# Patient Record
Sex: FEMALE | Race: BLACK OR AFRICAN AMERICAN | NOT HISPANIC OR LATINO | URBAN - METROPOLITAN AREA
[De-identification: names, ages, dates, MRNs, and addresses within clinical notes are randomized per-mention and may not be internally consistent; named-entity substitution may affect disease eponyms.]

---

## 2017-12-01 ENCOUNTER — OUTPATIENT (OUTPATIENT)
Dept: OUTPATIENT SERVICES | Facility: HOSPITAL | Age: 32
LOS: 1 days | End: 2017-12-01
Payer: MEDICAID

## 2017-12-12 DIAGNOSIS — R69 ILLNESS, UNSPECIFIED: ICD-10-CM

## 2018-03-01 PROCEDURE — G9001: CPT

## 2022-12-21 ENCOUNTER — INPATIENT (INPATIENT)
Facility: HOSPITAL | Age: 37
LOS: 1 days | Discharge: HOME | DRG: 517 | End: 2022-12-23
Attending: FAMILY MEDICINE | Admitting: HOSPITALIST
Payer: MEDICAID

## 2022-12-21 ENCOUNTER — TRANSCRIPTION ENCOUNTER (OUTPATIENT)
Age: 37
End: 2022-12-21

## 2022-12-21 VITALS
HEIGHT: 64 IN | OXYGEN SATURATION: 99 % | DIASTOLIC BLOOD PRESSURE: 77 MMHG | TEMPERATURE: 98 F | SYSTOLIC BLOOD PRESSURE: 118 MMHG | HEART RATE: 64 BPM | RESPIRATION RATE: 16 BRPM | WEIGHT: 212.75 LBS

## 2022-12-21 DIAGNOSIS — M51.9 UNSPECIFIED THORACIC, THORACOLUMBAR AND LUMBOSACRAL INTERVERTEBRAL DISC DISORDER: ICD-10-CM

## 2022-12-21 LAB
ALBUMIN SERPL ELPH-MCNC: 4.3 G/DL — SIGNIFICANT CHANGE UP (ref 3.3–5.2)
ALLERGY+IMMUNOLOGY DIAG STUDY NOTE: SIGNIFICANT CHANGE UP
ALP SERPL-CCNC: 94 U/L — SIGNIFICANT CHANGE UP (ref 40–120)
ALT FLD-CCNC: 11 U/L — SIGNIFICANT CHANGE UP
ANION GAP SERPL CALC-SCNC: 11 MMOL/L — SIGNIFICANT CHANGE UP (ref 5–17)
APTT BLD: 28.7 SEC — SIGNIFICANT CHANGE UP (ref 27.5–35.5)
AST SERPL-CCNC: 14 U/L — SIGNIFICANT CHANGE UP
BILIRUB SERPL-MCNC: 0.2 MG/DL — LOW (ref 0.4–2)
BLD GP AB SCN SERPL QL: SIGNIFICANT CHANGE UP
BUN SERPL-MCNC: 14 MG/DL — SIGNIFICANT CHANGE UP (ref 8–20)
CALCIUM SERPL-MCNC: 9.1 MG/DL — SIGNIFICANT CHANGE UP (ref 8.4–10.5)
CHLORIDE SERPL-SCNC: 102 MMOL/L — SIGNIFICANT CHANGE UP (ref 96–108)
CO2 SERPL-SCNC: 21 MMOL/L — LOW (ref 22–29)
CREAT SERPL-MCNC: 0.68 MG/DL — SIGNIFICANT CHANGE UP (ref 0.5–1.3)
DIR ANTIGLOB POLYSPECIFIC INTERPRETATION: SIGNIFICANT CHANGE UP
EGFR: 116 ML/MIN/1.73M2 — SIGNIFICANT CHANGE UP
FLUAV AG NPH QL: SIGNIFICANT CHANGE UP
FLUBV AG NPH QL: SIGNIFICANT CHANGE UP
GLUCOSE SERPL-MCNC: 129 MG/DL — HIGH (ref 70–99)
HCG SERPL-ACNC: <4 MIU/ML — SIGNIFICANT CHANGE UP
HCT VFR BLD CALC: 37 % — SIGNIFICANT CHANGE UP (ref 34.5–45)
HGB BLD-MCNC: 12.2 G/DL — SIGNIFICANT CHANGE UP (ref 11.5–15.5)
INR BLD: 1.17 RATIO — HIGH (ref 0.88–1.16)
MCHC RBC-ENTMCNC: 30.3 PG — SIGNIFICANT CHANGE UP (ref 27–34)
MCHC RBC-ENTMCNC: 33 GM/DL — SIGNIFICANT CHANGE UP (ref 32–36)
MCV RBC AUTO: 92 FL — SIGNIFICANT CHANGE UP (ref 80–100)
PLATELET # BLD AUTO: 341 K/UL — SIGNIFICANT CHANGE UP (ref 150–400)
POTASSIUM SERPL-MCNC: 4 MMOL/L — SIGNIFICANT CHANGE UP (ref 3.5–5.3)
POTASSIUM SERPL-SCNC: 4 MMOL/L — SIGNIFICANT CHANGE UP (ref 3.5–5.3)
PROT SERPL-MCNC: 7.8 G/DL — SIGNIFICANT CHANGE UP (ref 6.6–8.7)
PROTHROM AB SERPL-ACNC: 13.6 SEC — HIGH (ref 10.5–13.4)
RBC # BLD: 4.02 M/UL — SIGNIFICANT CHANGE UP (ref 3.8–5.2)
RBC # FLD: 12.5 % — SIGNIFICANT CHANGE UP (ref 10.3–14.5)
RSV RNA NPH QL NAA+NON-PROBE: SIGNIFICANT CHANGE UP
SARS-COV-2 RNA SPEC QL NAA+PROBE: SIGNIFICANT CHANGE UP
SODIUM SERPL-SCNC: 134 MMOL/L — LOW (ref 135–145)
WBC # BLD: 12.38 K/UL — HIGH (ref 3.8–10.5)
WBC # FLD AUTO: 12.38 K/UL — HIGH (ref 3.8–10.5)

## 2022-12-21 PROCEDURE — 72131 CT LUMBAR SPINE W/O DYE: CPT | Mod: 26,MA

## 2022-12-21 PROCEDURE — 99222 1ST HOSP IP/OBS MODERATE 55: CPT

## 2022-12-21 PROCEDURE — 72148 MRI LUMBAR SPINE W/O DYE: CPT | Mod: 26

## 2022-12-21 PROCEDURE — 99285 EMERGENCY DEPT VISIT HI MDM: CPT

## 2022-12-21 RX ORDER — MORPHINE SULFATE 50 MG/1
4 CAPSULE, EXTENDED RELEASE ORAL ONCE
Refills: 0 | Status: DISCONTINUED | OUTPATIENT
Start: 2022-12-21 | End: 2022-12-21

## 2022-12-21 RX ORDER — DEXAMETHASONE 0.5 MG/5ML
10 ELIXIR ORAL ONCE
Refills: 0 | Status: COMPLETED | OUTPATIENT
Start: 2022-12-21 | End: 2022-12-21

## 2022-12-21 RX ORDER — SODIUM CHLORIDE 9 MG/ML
1000 INJECTION, SOLUTION INTRAVENOUS
Refills: 0 | Status: DISCONTINUED | OUTPATIENT
Start: 2022-12-21 | End: 2022-12-22

## 2022-12-21 RX ORDER — METHOCARBAMOL 500 MG/1
1500 TABLET, FILM COATED ORAL ONCE
Refills: 0 | Status: COMPLETED | OUTPATIENT
Start: 2022-12-21 | End: 2022-12-21

## 2022-12-21 RX ORDER — ONDANSETRON 8 MG/1
4 TABLET, FILM COATED ORAL EVERY 8 HOURS
Refills: 0 | Status: DISCONTINUED | OUTPATIENT
Start: 2022-12-21 | End: 2022-12-22

## 2022-12-21 RX ORDER — HYDROMORPHONE HYDROCHLORIDE 2 MG/ML
0.5 INJECTION INTRAMUSCULAR; INTRAVENOUS; SUBCUTANEOUS EVERY 4 HOURS
Refills: 0 | Status: DISCONTINUED | OUTPATIENT
Start: 2022-12-21 | End: 2022-12-22

## 2022-12-21 RX ORDER — LANOLIN ALCOHOL/MO/W.PET/CERES
3 CREAM (GRAM) TOPICAL AT BEDTIME
Refills: 0 | Status: DISCONTINUED | OUTPATIENT
Start: 2022-12-21 | End: 2022-12-22

## 2022-12-21 RX ORDER — ACETAMINOPHEN 500 MG
650 TABLET ORAL EVERY 6 HOURS
Refills: 0 | Status: DISCONTINUED | OUTPATIENT
Start: 2022-12-21 | End: 2022-12-22

## 2022-12-21 RX ORDER — KETOROLAC TROMETHAMINE 30 MG/ML
30 SYRINGE (ML) INJECTION ONCE
Refills: 0 | Status: DISCONTINUED | OUTPATIENT
Start: 2022-12-21 | End: 2022-12-21

## 2022-12-21 RX ADMIN — MORPHINE SULFATE 4 MILLIGRAM(S): 50 CAPSULE, EXTENDED RELEASE ORAL at 15:56

## 2022-12-21 RX ADMIN — Medication 30 MILLIGRAM(S): at 13:12

## 2022-12-21 RX ADMIN — Medication 30 MILLIGRAM(S): at 13:42

## 2022-12-21 RX ADMIN — METHOCARBAMOL 1500 MILLIGRAM(S): 500 TABLET, FILM COATED ORAL at 13:12

## 2022-12-21 RX ADMIN — MORPHINE SULFATE 4 MILLIGRAM(S): 50 CAPSULE, EXTENDED RELEASE ORAL at 21:48

## 2022-12-21 RX ADMIN — MORPHINE SULFATE 4 MILLIGRAM(S): 50 CAPSULE, EXTENDED RELEASE ORAL at 21:03

## 2022-12-21 RX ADMIN — Medication 102 MILLIGRAM(S): at 13:12

## 2022-12-21 RX ADMIN — MORPHINE SULFATE 4 MILLIGRAM(S): 50 CAPSULE, EXTENDED RELEASE ORAL at 21:18

## 2022-12-21 NOTE — ED PROVIDER NOTE - CLINICAL SUMMARY MEDICAL DECISION MAKING FREE TEXT BOX
37-year-old female presents to ED complaining of lower back pain x1 day today.  Patient explained that she was moving and doing normal activity when she felt sharp pain in the left lower back . Patient states the pain is radiate radiating down her left leg into her heel . Patient admits to tingling sensation in her left lower extremity.  Patient denies any lower extremity weakness. Patient denies any urine or fecal incontinence. Examination positive tenderness on palpation of lower lumbar spine, tenderness to spinal palpation, tenderness to left paraspinal region.  Straight leg raise positive to the left.  No weakness noted positive but very low pulses intact. Patient treated for pain and CT lumbar spine ordered

## 2022-12-21 NOTE — CONSULT NOTE ADULT - SUBJECTIVE AND OBJECTIVE BOX
HPI:  · HPI Objective Statement: 37-year-old female presents to ED complaining of lower back pain x1 day today.  Patient explained that she was moving and doing normal activity when she felt sharp pain in the left lower back . Patient states the pain is radiate radiating down her left leg into her heel . Patient admits to tingling sensation in her left lower extremity.  Patient denies any lower extremity weakness. Patient denies any urine or fecal incontinence . Patient denies any fall trauma or accidents to her lower back.  Patient denies any significant past medical history.  Patient is a surgical resection of close to her face.  Patient denies any medical medication at this time.  Patient denies any allergies to medication.      Pt denies having any trauma. She states that she was ok till last night at which time she started having severe back pain radiating into the right lower back pain into buttock and right lower extrem. Pt states she was unable to ambulate today that her right lower extrem would give out. Pt denies bowel or bladder incontinence.       PAST MEDICAL & SURGICAL HISTORY:  denies cardiac, respiratory, abd discomfort.     REVIEW OF SYSTEMS:    CONSTITUTIONAL: No fever, weight loss, or fatigue  EYES: No eye pain, visual disturbances, or discharge  ENMT:  No difficulty hearing, tinnitus, vertigo; No sinus or throat pain  NECK: No pain or stiffness  BREASTS: No pain, masses, or nipple discharge  RESPIRATORY: No cough, wheezing, chills or hemoptysis; No shortness of breath  CARDIOVASCULAR: No chest pain, palpitations, dizziness, or leg swelling  GASTROINTESTINAL: No abdominal or epigastric pain. No nausea, vomiting, or hematemesis; No diarrhea or constipation. No melena or hematochezia.  GENITOURINARY: No dysuria, frequency, hematuria, or incontinence  NEUROLOGICAL: No headaches, memory loss, loss of strength, numbness, or tremors  SKIN: No itching, burning, rashes, or lesions   LYMPH NODES: No enlarged glands  ENDOCRINE: No heat or cold intolerance; No hair loss  MUSCULOSKELETAL: No joint pain or swelling; No muscle, back, or extremity pain  PSYCHIATRIC: No depression, anxiety, mood swings, or difficulty sleeping  HEME/LYMPH: No easy bruising, or bleeding gums  ALLERY AND IMMUNOLOGIC: No hives or eczema    Allergies  No Known Allergies  Intolerances      MEDICATIONS  (STANDING):  MEDICATIONS  (PRN):    SOCIAL HISTORY: nonpertinent  FAMILY HISTORY: nonpertinent    Vital Signs Last 24 Hrs  T(C): 36.5 (21 Dec 2022 10:44), Max: 36.5 (21 Dec 2022 10:44)  T(F): 97.7 (21 Dec 2022 10:44), Max: 97.7 (21 Dec 2022 10:44)  HR: 64 (21 Dec 2022 10:44) (64 - 64)  BP: 118/77 (21 Dec 2022 10:44) (118/77 - 118/77)  BP(mean): --  RR: 16 (21 Dec 2022 10:44) (16 - 16)  SpO2: 99% (21 Dec 2022 10:44) (99% - 99%)    Parameters below as of 21 Dec 2022 10:44  Patient On (Oxygen Delivery Method): room air    PHYSICAL EXAM:  GENERAL: NAD,   HEAD:  Atraumatic, Normocephalic  EYES: EOMI, PERRLA, conjunctiva and sclera clear  ENMT: No tonsillar erythema, exudates, or enlargement; Moist mucous membranes, Good dentition, No lesions  NECK: Supple,  NERVOUS SYSTEM:  Alert & Oriented X3, Good concentration; Motor Strength 5/5 B/L upper and right lower buttock pain radiates right lateral thigh into the calf. pos straight leg raise, pos df 4/5, df 4/5 ehl 2/5 lower extremity; left lower 5/5 hip, knee 5/5, df 5/5 pf 5/5, EHL 5/5, neg clonus. DTRs 2+ intact and symmetric  CHEST/LUNG: Clear BS bilaterally;   HEART: Regular rate and rhythm; No murmurs, rubs, or gallops  ABDOMEN: Soft, Nontender, Nondistended; Bowel sounds present  EXTREMITIES:  2+ Peripheral Pulses, No edema  muscular skeleton neg tenderness with palp   sensory pos numbness of the right lower extrem lateral       LABS:      RADIOLOGY & ADDITIONAL STUDIES:  ~~~~~~~~~~~~~~~~~~~~~~~~~~~~~~  < from: CT Lumbar Spine No Cont (12.21.22 @ 14:40) >  ACC: [id number] EXAM:  CT LUMBAR SPINE                        PROCEDURE DATE:  12/21/2022    IMPRESSION:  Moderate right paracentral/foraminal disc extrusion L5-S1 with mass   effect on the right S1 nerve root within the right lateral recess and   severe right neural foramina narrowing.  --- End of Report ---  < end of copied text >

## 2022-12-21 NOTE — ED ADULT NURSE NOTE - OBJECTIVE STATEMENT
right lower back pain that began yesterday and worsened today radiating down right leg.  Pain with ambulation.  Pt denies trauma or illness.

## 2022-12-21 NOTE — ED PROVIDER NOTE - OBJECTIVE STATEMENT
37-year-old female presents to ED complaining of lower back pain x1 day today.  Patient explained that she was moving and doing normal activity when she felt sharp pain in the left lower back . Patient states the pain is radiate radiating down her left leg into her heel . Patient admits to tingling sensation in her left lower extremity.  Patient denies any lower extremity weakness. Patient denies any urine or fecal incontinence . Patient denies any fall trauma or accidents to her lower back.  Patient denies any significant past medical history.  Patient is a surgical resection of close to her face.  Patient denies any medical medication at this time.  Patient denies any allergies to medication. 37-year-old female presents to ED complaining of lower back pain x1 day today.  Patient explained that she was moving and doing normal activity when she felt sharp pain in the left lower back . Patient states the pain is radiate radiating down her right leg into her heel . Patient admits to tingling sensation in her right lower extremity.  Patient denies any lower extremity weakness. Patient denies any urine or fecal incontinence . Patient denies any fall trauma or accidents to her lower back.  Patient denies any significant past medical history.  Patient is a surgical resection of close to her face.  Patient denies any medical medication at this time.  Patient denies any allergies to medication.

## 2022-12-21 NOTE — ED ADULT TRIAGE NOTE - CHIEF COMPLAINT QUOTE
Pt developed right sided back pain radiating down into her right left yesterday. Denies any injury or heavy lifting. No numbness or tingling in her left foot but states that it is tingling in her though. Nothing taken for pain.

## 2022-12-21 NOTE — ED PROVIDER NOTE - NS ED ATTENDING STATEMENT MOD
This was a shared visit with the BERTIN. I reviewed and verified the documentation and independently performed the documented:

## 2022-12-21 NOTE — ED PROVIDER NOTE - PHYSICAL EXAMINATION
.Back :  No deformity, lesions or injury noted to back on inspection. Spine appears normal with normal  anatomical curves. No spinal point tenderness on palpation. + paraspinal muscle point tenderness. Tenderness noted on palpation of gluteal muscle noted on right side . Normal straight leg raise noted. Normal knee and ankle reflex noted.

## 2022-12-21 NOTE — CONSULT NOTE ADULT - ASSESSMENT
This is a 37yf presented with back pain which radiated into the right lower extrem and cause weakness ( the right lower extrem giving out)  pos straight leg raised.   ct of the lumbar spine-mod right hnp l5-s1 with mass effect on the right s1.     Plan  -admit for pain control  -pt will need mri of the lumbar spine today  -preop for lumbar laminectomy  -films and exam reviewed and discussed with Dr Castellanos and recom as above.

## 2022-12-21 NOTE — ED PROVIDER NOTE - PROGRESS NOTE DETAILS
Pt with lower back pain and right lower leg weakness on examination . Normal sensation to left lower extremity and bilateral normal sensation bilateral decreased strength to right lower extremity.  Rest of exam completely normal. Pt moved form intake Room. Pt seen and evaluated by intake Physician. HPI, Physical examination performed by intake Physician . Note reviewed and followup examination performed by me consistent with initial assessment. Agrees with intake Physician plan and tests. CAT scan and L5-S1 mild right paracentral/foraminal disc extrusion with mass-effect on the right S1 nerve root within the right lateral recess and severe right neuroforaminal narrowing noted.  Patient CT is finding discussed with neurosurgery team neurosurgery evaluated patient in the ED and requested MRI and preop

## 2022-12-21 NOTE — CONSULT NOTE ADULT - NS ATTEND AMEND GEN_ALL_CORE FT
The procedure and the goals were explained to the pt. The risks were explained in details. The pt signed the consent

## 2022-12-21 NOTE — ED PROVIDER NOTE - ATTENDING APP SHARED VISIT CONTRIBUTION OF CARE
37-year-old female presents to ED complaining of lower back pain x1 day today.  Patient explained that she was moving and doing normal activity when she felt sharp pain in the left lower back . Patient states the pain is radiate radiating down her right leg into her heel . Patient admits to tingling sensation in her right lower extremity.  Patient denies any lower extremity weakness. Patient denies any urine or fecal incontinence . Patient denies any fall trauma or accidents to her lower back.  Patient denies any significant past medical history.  Patient is a surgical resection of close to her face.  Patient denies any medical medication at this time.  Patient denies any allergies to medication.    on exam pt with pain and wekness with using her left leg. sesntion intact. will obtain ct l spine, pain control, reassess

## 2022-12-22 ENCOUNTER — RESULT REVIEW (OUTPATIENT)
Age: 37
End: 2022-12-22

## 2022-12-22 ENCOUNTER — TRANSCRIPTION ENCOUNTER (OUTPATIENT)
Age: 37
End: 2022-12-22

## 2022-12-22 ENCOUNTER — APPOINTMENT (OUTPATIENT)
Age: 37
End: 2022-12-22

## 2022-12-22 DIAGNOSIS — S01.81XA LACERATION WITHOUT FOREIGN BODY OF OTHER PART OF HEAD, INITIAL ENCOUNTER: Chronic | ICD-10-CM

## 2022-12-22 LAB
ABO RH CONFIRMATION: SIGNIFICANT CHANGE UP
ANION GAP SERPL CALC-SCNC: 12 MMOL/L — SIGNIFICANT CHANGE UP (ref 5–17)
APPEARANCE UR: CLEAR — SIGNIFICANT CHANGE UP
BACTERIA # UR AUTO: ABNORMAL
BILIRUB UR-MCNC: NEGATIVE — SIGNIFICANT CHANGE UP
BUN SERPL-MCNC: 15 MG/DL — SIGNIFICANT CHANGE UP (ref 8–20)
CALCIUM SERPL-MCNC: 9 MG/DL — SIGNIFICANT CHANGE UP (ref 8.4–10.5)
CHLORIDE SERPL-SCNC: 102 MMOL/L — SIGNIFICANT CHANGE UP (ref 96–108)
CO2 SERPL-SCNC: 22 MMOL/L — SIGNIFICANT CHANGE UP (ref 22–29)
COLOR SPEC: YELLOW — SIGNIFICANT CHANGE UP
CREAT SERPL-MCNC: 0.67 MG/DL — SIGNIFICANT CHANGE UP (ref 0.5–1.3)
DIFF PNL FLD: ABNORMAL
EGFR: 117 ML/MIN/1.73M2 — SIGNIFICANT CHANGE UP
EPI CELLS # UR: SIGNIFICANT CHANGE UP
GLUCOSE SERPL-MCNC: 141 MG/DL — HIGH (ref 70–99)
GLUCOSE UR QL: NEGATIVE MG/DL — SIGNIFICANT CHANGE UP
HCT VFR BLD CALC: 34.3 % — LOW (ref 34.5–45)
HGB BLD-MCNC: 11.1 G/DL — LOW (ref 11.5–15.5)
KETONES UR-MCNC: NEGATIVE — SIGNIFICANT CHANGE UP
LEUKOCYTE ESTERASE UR-ACNC: NEGATIVE — SIGNIFICANT CHANGE UP
MAGNESIUM SERPL-MCNC: 1.9 MG/DL — SIGNIFICANT CHANGE UP (ref 1.6–2.6)
MCHC RBC-ENTMCNC: 30 PG — SIGNIFICANT CHANGE UP (ref 27–34)
MCHC RBC-ENTMCNC: 32.4 GM/DL — SIGNIFICANT CHANGE UP (ref 32–36)
MCV RBC AUTO: 92.7 FL — SIGNIFICANT CHANGE UP (ref 80–100)
MRSA PCR RESULT.: SIGNIFICANT CHANGE UP
NITRITE UR-MCNC: NEGATIVE — SIGNIFICANT CHANGE UP
PH UR: 6.5 — SIGNIFICANT CHANGE UP (ref 5–8)
PHOSPHATE SERPL-MCNC: 2.4 MG/DL — SIGNIFICANT CHANGE UP (ref 2.4–4.7)
PLATELET # BLD AUTO: 323 K/UL — SIGNIFICANT CHANGE UP (ref 150–400)
POTASSIUM SERPL-MCNC: 3.8 MMOL/L — SIGNIFICANT CHANGE UP (ref 3.5–5.3)
POTASSIUM SERPL-SCNC: 3.8 MMOL/L — SIGNIFICANT CHANGE UP (ref 3.5–5.3)
PROT UR-MCNC: NEGATIVE — SIGNIFICANT CHANGE UP
RBC # BLD: 3.7 M/UL — LOW (ref 3.8–5.2)
RBC # FLD: 12.6 % — SIGNIFICANT CHANGE UP (ref 10.3–14.5)
RBC CASTS # UR COMP ASSIST: ABNORMAL /HPF (ref 0–4)
S AUREUS DNA NOSE QL NAA+PROBE: SIGNIFICANT CHANGE UP
SODIUM SERPL-SCNC: 136 MMOL/L — SIGNIFICANT CHANGE UP (ref 135–145)
SP GR SPEC: 1.01 — SIGNIFICANT CHANGE UP (ref 1.01–1.02)
UROBILINOGEN FLD QL: NEGATIVE MG/DL — SIGNIFICANT CHANGE UP
WBC # BLD: 11.82 K/UL — HIGH (ref 3.8–10.5)
WBC # FLD AUTO: 11.82 K/UL — HIGH (ref 3.8–10.5)
WBC UR QL: SIGNIFICANT CHANGE UP /HPF (ref 0–5)

## 2022-12-22 PROCEDURE — 93010 ELECTROCARDIOGRAM REPORT: CPT

## 2022-12-22 PROCEDURE — 88304 TISSUE EXAM BY PATHOLOGIST: CPT | Mod: 26

## 2022-12-22 PROCEDURE — 63030 LAMOT DCMPRN NRV RT 1 LMBR: CPT | Mod: RT

## 2022-12-22 PROCEDURE — 71045 X-RAY EXAM CHEST 1 VIEW: CPT | Mod: 26

## 2022-12-22 PROCEDURE — 99233 SBSQ HOSP IP/OBS HIGH 50: CPT

## 2022-12-22 DEVICE — SURGIFOAM PAD 8CM X 12.5CM X 10MM (100): Type: IMPLANTABLE DEVICE | Status: FUNCTIONAL

## 2022-12-22 DEVICE — FLOSEAL FAST PREP 10ML: Type: IMPLANTABLE DEVICE | Status: FUNCTIONAL

## 2022-12-22 RX ORDER — OXYCODONE HYDROCHLORIDE 5 MG/1
5 TABLET ORAL EVERY 6 HOURS
Refills: 0 | Status: DISCONTINUED | OUTPATIENT
Start: 2022-12-22 | End: 2022-12-23

## 2022-12-22 RX ORDER — ALBUTEROL 90 UG/1
2.5 AEROSOL, METERED ORAL ONCE
Refills: 0 | Status: COMPLETED | OUTPATIENT
Start: 2022-12-22 | End: 2022-12-22

## 2022-12-22 RX ORDER — CYCLOBENZAPRINE HYDROCHLORIDE 10 MG/1
10 TABLET, FILM COATED ORAL EVERY 8 HOURS
Refills: 0 | Status: DISCONTINUED | OUTPATIENT
Start: 2022-12-22 | End: 2022-12-23

## 2022-12-22 RX ORDER — INFLUENZA VIRUS VACCINE 15; 15; 15; 15 UG/.5ML; UG/.5ML; UG/.5ML; UG/.5ML
0.5 SUSPENSION INTRAMUSCULAR ONCE
Refills: 0 | Status: DISCONTINUED | OUTPATIENT
Start: 2022-12-22 | End: 2022-12-23

## 2022-12-22 RX ORDER — CEFAZOLIN SODIUM 1 G
2000 VIAL (EA) INJECTION EVERY 8 HOURS
Refills: 0 | Status: DISCONTINUED | OUTPATIENT
Start: 2022-12-22 | End: 2022-12-22

## 2022-12-22 RX ORDER — OXYCODONE HYDROCHLORIDE 5 MG/1
10 TABLET ORAL EVERY 6 HOURS
Refills: 0 | Status: DISCONTINUED | OUTPATIENT
Start: 2022-12-22 | End: 2022-12-23

## 2022-12-22 RX ORDER — HYDROMORPHONE HYDROCHLORIDE 2 MG/ML
0.5 INJECTION INTRAMUSCULAR; INTRAVENOUS; SUBCUTANEOUS
Refills: 0 | Status: DISCONTINUED | OUTPATIENT
Start: 2022-12-22 | End: 2022-12-22

## 2022-12-22 RX ORDER — ONDANSETRON 8 MG/1
4 TABLET, FILM COATED ORAL ONCE
Refills: 0 | Status: DISCONTINUED | OUTPATIENT
Start: 2022-12-22 | End: 2022-12-22

## 2022-12-22 RX ORDER — FENTANYL CITRATE 50 UG/ML
25 INJECTION INTRAVENOUS
Refills: 0 | Status: DISCONTINUED | OUTPATIENT
Start: 2022-12-22 | End: 2022-12-22

## 2022-12-22 RX ORDER — POLYETHYLENE GLYCOL 3350 17 G/17G
17 POWDER, FOR SOLUTION ORAL EVERY 24 HOURS
Refills: 0 | Status: DISCONTINUED | OUTPATIENT
Start: 2022-12-22 | End: 2022-12-23

## 2022-12-22 RX ORDER — APREPITANT 80 MG/1
40 CAPSULE ORAL ONCE
Refills: 0 | Status: COMPLETED | OUTPATIENT
Start: 2022-12-22 | End: 2022-12-22

## 2022-12-22 RX ORDER — ALBUTEROL 90 UG/1
2.5 AEROSOL, METERED ORAL ONCE
Refills: 0 | Status: DISCONTINUED | OUTPATIENT
Start: 2022-12-22 | End: 2022-12-22

## 2022-12-22 RX ORDER — SENNA PLUS 8.6 MG/1
2 TABLET ORAL AT BEDTIME
Refills: 0 | Status: DISCONTINUED | OUTPATIENT
Start: 2022-12-22 | End: 2022-12-23

## 2022-12-22 RX ORDER — CEFAZOLIN SODIUM 1 G
2000 VIAL (EA) INJECTION EVERY 8 HOURS
Refills: 0 | Status: COMPLETED | OUTPATIENT
Start: 2022-12-22 | End: 2022-12-23

## 2022-12-22 RX ADMIN — CYCLOBENZAPRINE HYDROCHLORIDE 10 MILLIGRAM(S): 10 TABLET, FILM COATED ORAL at 22:32

## 2022-12-22 RX ADMIN — OXYCODONE HYDROCHLORIDE 10 MILLIGRAM(S): 5 TABLET ORAL at 21:00

## 2022-12-22 RX ADMIN — Medication 650 MILLIGRAM(S): at 01:15

## 2022-12-22 RX ADMIN — SODIUM CHLORIDE 125 MILLILITER(S): 9 INJECTION, SOLUTION INTRAVENOUS at 00:45

## 2022-12-22 RX ADMIN — POLYETHYLENE GLYCOL 3350 17 GRAM(S): 17 POWDER, FOR SOLUTION ORAL at 22:30

## 2022-12-22 RX ADMIN — Medication 650 MILLIGRAM(S): at 00:45

## 2022-12-22 RX ADMIN — APREPITANT 40 MILLIGRAM(S): 80 CAPSULE ORAL at 11:38

## 2022-12-22 RX ADMIN — ALBUTEROL 2.5 MILLIGRAM(S): 90 AEROSOL, METERED ORAL at 16:04

## 2022-12-22 RX ADMIN — OXYCODONE HYDROCHLORIDE 10 MILLIGRAM(S): 5 TABLET ORAL at 20:03

## 2022-12-22 RX ADMIN — SENNA PLUS 2 TABLET(S): 8.6 TABLET ORAL at 22:32

## 2022-12-22 RX ADMIN — Medication 2000 MILLIGRAM(S): at 22:35

## 2022-12-22 NOTE — H&P ADULT - ASSESSMENT
38 y/o female with L5/S1 radiculopathy, obesity    Radiculopathy:  -Admit to medical floor  -Neuro Checks  -NPO for OR per Neuro Sx  -IVF, anti-emetics, analgesics  -Incentive spirometer  -Fall risk, VC boots while in bed  -Labs/CXR as above  -Mild leukocytosis likely reactive, no urinary/resp. symptoms reported, RVP neg  -Normal exercise tolerance, no CV risk factors  -No further pre-operative testing needed at this time  -May proceed to OR per Neurosurgical plan     Obesity:  -Diet/exercise education  -Nutrition eval    Discussed with Patient, Mother at bedside, ED staff

## 2022-12-22 NOTE — PROGRESS NOTE ADULT - ASSESSMENT
36 y/o female with  obesity came with back pain with rt leg weakness found to have  mri spine Right foraminal/paracentral disc protrusion at L5-S1 compressing the   traversing.right S1 nerve root and impinging the exiting right L5 nerve root.     L5-S1 Radiculopathy W/ Right foraminal/paracentral disc protrusion at L5-S1 compressing the traversing.  - mri spine Right foraminal/paracentral disc protrusion at L5-S1 compressing the traversing.right S1 nerve root and impinging the exiting right L5 nerve root.   -Neuro Checks  -NPO for OR per Neuro Sx  -IVF, anti-emetics, analgesics  -Incentive spirometer.pain control  -Fall risk, VC boots while in bed  -Labs/CXR as above  -Mild leukocytosis likely reactive, no urinary/resp. symptoms reported, RVP neg  -Normal exercise tolerance, no CV risk factors  -Pt denies any  HX OF HEART/LUNG Disease/bleeding/cloting disorder. denies FH sudden death/heart/lung disease/stroke . neg pregnancy test  -No further pre-operative testing needed at this time  -May proceed to OR per Neurosurgical plan     Obesity:  -Diet/exercise education  -Nutrition eval    HX Marijuana abuse  - to refrain from any illicit drugs  -drugs alcohol socially. ..likely once a month per pt.    scd's  plan of care dw pt        Discussed with Patient, Mother at bedside, ED staff

## 2022-12-22 NOTE — PROGRESS NOTE ADULT - SUBJECTIVE AND OBJECTIVE BOX
POST-OPERATIVE NOTE    Procedure:    Diagnosis/Indication:    Surgeon:    INTERVAL HPI/ACUTE EVENTS:  37yFemale PMH admitted with now s/p ___ POD#0. Patient seen lying comfortably in bed. Denies CP, SOB, SANTOS, calf tenderness. Pain controlled with medication.    VITALS:  T(C): 36.5 (12-22-22 @ 14:48), Max: 37.1 (12-22-22 @ 11:16)  HR: 70 (12-22-22 @ 15:04) (54 - 80)  BP: 139/75 (12-22-22 @ 15:04) (116/64 - 142/68)  RR: 17 (12-22-22 @ 15:04) (14 - 20)  SpO2: 100% (12-22-22 @ 15:04) (98% - 100%)  Wt(kg): --    PHYSICAL EXAM:  GENERAL: NAD, well-groomed, well-developed  HEAD:  S/p L crani. Dressing clean, dry, intact. Dried blood noted, not fully saturated.  DRAINS: Subgaleal/epidural/subdural drain to bulb suction/thumbprint suction/gravity. Serosanguinous drainage noted.  NECK: C-collar in place. Dressing clean, dry, intact  WOUND: Dressing clean dry intact  BC COMA SCORE: E- V- M- =       E: 4= opens eyes spontaneously 3= to voice 2= to noxious 1= no opening       V: 5= oriented 4= confused 3= inappropriate words 2= incomprehensible sounds 1= nonverbal 1T= intubated       M: 6= follows commands 5= localizes 4= withdraws 3= flexor posturing 2= extensor posturing 1= no movement  MENTAL STATUS: AAO x3; Awake/Comatose; Opens eyes spontaneously/to voice/to light touch/to noxious stimuli; Appropriately conversant without aphasia/Nonverbal; following simple commands/mimicking/not following commands  CRANIAL NERVES: Visual acuity normal for age, visual fields full to confrontation, PERRL. EOMI without nystagmus. Facial sensation intact V1-3 distribution b/l. Face symmetric w/ normal eye closure and smile, tongue midline. Hearing grossly intact. Speech clear. Head turning and shoulder shrug intact.   REFLEXES: PERRL. Corneals intact b/l. Gag intact. Cough intact. Oculocephalic reflex intact (Doll's eye). Negative Flaherty's b/l. Negative clonus b/l  MOTOR: strength 5/5 b/l upper and lower extremities  Uppers     Delt (C5/6)     Bicep (C5/6)     Wrist Extend (C6)     Tricep (C7)     HG (C8/T1)  R                     5/5                 5/5                         5/5                           5/5                   5/5  L                      5/5                 5/5                         5/5                           5/5                   5/5  Lowers      HF(L1/L2)     KE (L3)     DF (L4)     EHL (L5)     PF (S1)      R                     5/5              5/5           5/5           5/5            5/5  L                     5/5               5/5          5/5            5/5            5/5  SENSATION: grossly intact to light touch all extremities  COORDINATION: Gait intact; rapid alternating movements intact; heel to shin intact; no upper extremity dysmetria  CHEST/LUNG: Clear to auscultation bilaterally; no rales, rhonchi, wheezing, or rubs  HEART: +S1/+S2; Regular rate and rhythm; no murmurs, rubs, or gallops  ABDOMEN: Soft, nontender, nondistended; bowel sounds present all four quadrants  EXTREMITIES:  2+ peripheral pulses, no clubbing, cyanosis, or edema  SKIN: Warm, dry; no rashes or lesions    LABS:                        11.1   11.82 )-----------( 323      ( 22 Dec 2022 02:20 )             34.3     12-22    136  |  102  |  15.0  ----------------------------<  141<H>  3.8   |  22.0  |  0.67    Ca    9.0      22 Dec 2022 02:20  Phos  2.4     12-22  Mg     1.9     12-22    TPro  7.8  /  Alb  4.3  /  TBili  0.2<L>  /  DBili  x   /  AST  14  /  ALT  11  /  AlkPhos  94  12-21      RADIOLOGY/OTHER:    CAPRINI SCORE [CLOT]:  Patient has an estimated Caprini score of greater than 5.  However, the patient's unique clinical situation will be addressed in an individual manner to determine appropriate anticoagulation treatment, if any. POST-OPERATIVE NOTE  Procedure: L5-S1 discectomy   Diagnosis/Indication: L5-S1 extrusion   Surgeon: Dr. Castellanos  Physician Assistant: Ruben Gamez    INTERVAL HPI/ACUTE EVENTS:  37F w/ PMHX of obesity, marijuana use presents to ED c/o LBP radiating down LLE. Found to have L5-S1  s/p L5-S1 discectomy POD#0  Patient tolerated procedure well. EBL 50 cc, fluid intake 1L, no cerrato. L5-S1 disc herniation, extubated in OR, following commands, transferring to PACU. Seen and examined post op, drowsy. Seen again later in day,     VITALS:  T(C): 36.5 (12-22-22 @ 14:48), Max: 37.1 (12-22-22 @ 11:16)  HR: 70 (12-22-22 @ 15:04) (54 - 80)  BP: 139/75 (12-22-22 @ 15:04) (116/64 - 142/68)  RR: 17 (12-22-22 @ 15:04) (14 - 20)  SpO2: 100% (12-22-22 @ 15:04) (98% - 100%)  Wt(kg): --    PHYSICAL EXAM:  GENERAL: NAD, well-groomed, well-developed  HEAD:  S/p L crani. Dressing clean, dry, intact. Dried blood noted, not fully saturated.  DRAINS: Subgaleal/epidural/subdural drain to bulb suction/thumbprint suction/gravity. Serosanguinous drainage noted.  NECK: C-collar in place. Dressing clean, dry, intact  WOUND: Dressing clean dry intact  BC COMA SCORE: E- V- M- =       E: 4= opens eyes spontaneously 3= to voice 2= to noxious 1= no opening       V: 5= oriented 4= confused 3= inappropriate words 2= incomprehensible sounds 1= nonverbal 1T= intubated       M: 6= follows commands 5= localizes 4= withdraws 3= flexor posturing 2= extensor posturing 1= no movement  MENTAL STATUS: AAO x3; Awake/Comatose; Opens eyes spontaneously/to voice/to light touch/to noxious stimuli; Appropriately conversant without aphasia/Nonverbal; following simple commands/mimicking/not following commands  CRANIAL NERVES: Visual acuity normal for age, visual fields full to confrontation, PERRL. EOMI without nystagmus. Facial sensation intact V1-3 distribution b/l. Face symmetric w/ normal eye closure and smile, tongue midline. Hearing grossly intact. Speech clear. Head turning and shoulder shrug intact.   REFLEXES: PERRL. Corneals intact b/l. Gag intact. Cough intact. Oculocephalic reflex intact (Doll's eye). Negative Flaherty's b/l. Negative clonus b/l  MOTOR: strength 5/5 b/l upper and lower extremities  Uppers     Delt (C5/6)     Bicep (C5/6)     Wrist Extend (C6)     Tricep (C7)     HG (C8/T1)  R                     5/5                 5/5                         5/5                           5/5                   5/5  L                      5/5                 5/5                         5/5                           5/5                   5/5  Lowers      HF(L1/L2)     KE (L3)     DF (L4)     EHL (L5)     PF (S1)      R                     5/5              5/5           5/5           5/5            5/5  L                     5/5               5/5          5/5            5/5            5/5  SENSATION: grossly intact to light touch all extremities  COORDINATION: Gait intact; rapid alternating movements intact; heel to shin intact; no upper extremity dysmetria  CHEST/LUNG: Clear to auscultation bilaterally; no rales, rhonchi, wheezing, or rubs  HEART: +S1/+S2; Regular rate and rhythm; no murmurs, rubs, or gallops  ABDOMEN: Soft, nontender, nondistended; bowel sounds present all four quadrants  EXTREMITIES:  2+ peripheral pulses, no clubbing, cyanosis, or edema  SKIN: Warm, dry; no rashes or lesions    LABS:             11.1   11.82 )-----------( 323      ( 22 Dec 2022 02:20 )             34.3     12-22    136  |  102  |  15.0  ----------------------------<  141<H>  3.8   |  22.0  |  0.67    Ca    9.0      22 Dec 2022 02:20  Phos  2.4     12-22  Mg     1.9     12-22    TPro  7.8  /  Alb  4.3  /  TBili  0.2<L>  /  DBili  x   /  AST  14  /  ALT  11  /  AlkPhos  94  12-21      RADIOLOGY/OTHER:  MR Lumbar Spine No Cont (12.21.22 @ 22:49)   IMPRESSION:  Right foraminal/paracentral disc protrusion at L5-S1 compressing the   traversing right S1 nerve root and impinging the exiting right L5 nerve root.   Recommend surgical consultation.    CT Lumbar Spine No Cont (12.21.22 @ 14:40)   IMPRESSION:  Moderate right paracentral/foraminal disc extrusion L5-S1 with mass   effect on the right S1 nerve root within the right lateral recess and   severe right neural foramina narrowing.       POST-OPERATIVE NOTE  Procedure: L5-S1 discectomy   Diagnosis/Indication: L5-S1 extrusion   Surgeon: Dr. Castellanos  Physician Assistant: Ruben Gamez    INTERVAL HPI/ACUTE EVENTS:  37F w/ PMHX of obesity, marijuana use presents to ED c/o LBP radiating down LLE. Found to have L5-S1  s/p L5-S1 discectomy POD#0  Patient tolerated procedure well. EBL 50 cc, fluid intake 1L, no cerrato. L5-S1 disc herniation, extubated in OR, following commands, transferring to PACU. Seen and examined post op, drowsy. Seen again later in day, much more pleasant, in no distress, sitting upright, tolerating diet. Patient states she has noticed improvement in weakness but did have difficulty ambulating with PT. On exam, neuro intact, strong, symmetrical. Only c/o L hand IV causing discomfort. Has voided but not had a bowel movement post op.     VITALS:  T(C): 36.5 (12-22-22 @ 14:48), Max: 37.1 (12-22-22 @ 11:16)  HR: 70 (12-22-22 @ 15:04) (54 - 80)  BP: 139/75 (12-22-22 @ 15:04) (116/64 - 142/68)  RR: 17 (12-22-22 @ 15:04) (14 - 20)  SpO2: 100% (12-22-22 @ 15:04) (98% - 100%)  Wt(kg): --    PHYSICAL EXAM:  GENERAL: NAD, well-groomed, well-developed  HEAD: Normocephalic, atraumatic   DRAINS: N/a  BACK: Dressing clean dry intact  BC COMA SCORE: E-4 V-5 M-6 = 15  MENTAL STATUS: AAO x3; Awake; Opens eyes spontaneously; Appropriately conversant without aphasia; following simple commands  CRANIAL NERVES: Visual acuity normal for age, PERRL. EOMI without nystagmus. Facial sensation intact V1-3 distribution b/l. Face symmetric w/ normal eye closure and smile, tongue midline. Hearing grossly intact. Speech clear.   MOTOR: strength 5/5 b/l upper and lower extremities; no drift  Uppers     Delt (C5/6)     Bicep (C5/6)     Wrist Extend (C6)     Tricep (C7)     HG (C8/T1)  R                     5/5                 5/5                         5/5                           5/5                   5/5  L                      5/5                 5/5                         5/5                           5/5                   5/5  Lowers      HF(L1/L2)     KE (L3)     DF (L4)     EHL (L5)     PF (S1)      R                     5/5              5/5           5/5           5/5            5/5  L                     5/5               5/5          5/5            5/5            5/5  SENSATION: grossly intact to light touch all extremities  CHEST/LUNG: Nonlabored breaths    LABS:             11.1   11.82 )-----------( 323      ( 22 Dec 2022 02:20 )             34.3     12-22    136  |  102  |  15.0  ----------------------------<  141<H>  3.8   |  22.0  |  0.67    Ca    9.0      22 Dec 2022 02:20  Phos  2.4     12-22  Mg     1.9     12-22    TPro  7.8  /  Alb  4.3  /  TBili  0.2<L>  /  DBili  x   /  AST  14  /  ALT  11  /  AlkPhos  94  12-21      RADIOLOGY/OTHER:  MR Lumbar Spine No Cont (12.21.22 @ 22:49)   IMPRESSION:  Right foraminal/paracentral disc protrusion at L5-S1 compressing the   traversing right S1 nerve root and impinging the exiting right L5 nerve root.   Recommend surgical consultation.    CT Lumbar Spine No Cont (12.21.22 @ 14:40)   IMPRESSION:  Moderate right paracentral/foraminal disc extrusion L5-S1 with mass   effect on the right S1 nerve root within the right lateral recess and   severe right neural foramina narrowing.

## 2022-12-22 NOTE — PROGRESS NOTE ADULT - ASSESSMENT
37F w/ PMHX of obesity, marijuana use presents to ED c/o LBP radiating down LLE. Foudn to have L5-S1           Plan   - PACU followed by floor  - Q4 neuro checks following PACU criteria   - Pain control PRN; Tylenol, Oxy 5/10    - Normotensive   - NS while NPO   - Monitor nausea, vomiting; Zofran PRN   - b/l SCDs; hold AC/AP/chemical DVT prophylaxis at this time   - Ins/outs   - Record stool count; Miralax, Senna   - Advance diet as tolerated   - Increase activity as tolerated, PT/OT ordered   - No brace required   - Wound: Closed w/ Monocryl, steri strips, gauze, tegaderm. May remove Mepilex on POD#3, 12/25. Steri strips to fall off. Permitted to showed on POD#5, 12/27.    - Plan for discharge in AM if medically optimized and cleared by primary team  - D/w Dr. Castellanos  37F w/ PMHX of obesity, marijuana use presents to ED c/o LBP radiating down LLE. Found to have L5-S1  s/p L5-S1 discectomy POD#0      Plan   - PACU followed by floor  - Q4 neuro checks following PACU criteria   - Pain control PRN; Tylenol, Oxy 5/10    - Normotensive   - NS while NPO   - Monitor nausea, vomiting; Zofran PRN   - b/l SCDs; hold AC/AP/chemical DVT prophylaxis at this time   - Ins/outs   - Record stool count; Miralax, Senna   - Advance diet as tolerated   - Increase activity as tolerated, PT/OT ordered   - No brace required   - Wound: Closed w/ Monocryl, steri strips, gauze, tegaderm. May remove Mepilex on POD#3, 12/25. Steri strips to fall off. Permitted to showed on POD#5, 12/27.    - Plan for discharge in AM if medically optimized and cleared by primary team  - D/w Dr. Castellanos

## 2022-12-22 NOTE — PHYSICAL THERAPY INITIAL EVALUATION ADULT - ADDITIONAL COMMENTS
Pt reports living in private home with her sister and her sister's family. 2 MED with no handrail and no stairs inside. Independent prior to admission Owns no DME.

## 2022-12-22 NOTE — H&P ADULT - HISTORY OF PRESENT ILLNESS
38 y/o female with hx of Obesity, presents to [facility] ED with c/o lower back pain for the past 24 hours. She denies any strenuous activity, falls, hard cough/sneezes, falls. Denies any hx of back pain or injury. Her pain is left sided radiating down her leg into heel. No associated weakness, bowel/bladder incontinence or saddle anesthesia. She has not taken any medications for her symptoms. Denies any other recent illnesses, fever, chills, N/V, SOB. Patient seen with Mother at bedside with Patient acknowledgment. In the ED had stable vitals, left leg raise positive. Labs essentially unremarkable. CT of L/S spine with L5/S1 herniation with foraminal narrowing. Seen by NeuroSx and MRI done with results pending. Plan is likely to go to the OR in AM for surgical intervention of disc herniation.

## 2022-12-22 NOTE — PROGRESS NOTE ADULT - SUBJECTIVE AND OBJECTIVE BOX
Patient is a 37y old  Female who presents with a chief complaint of L5/S1 radiculopathy (22 Dec 2022 00:00)      c/o lower back pain radiates to rt leg with some weakness. .denies numbess,bowel/bladder issues, paresthesia   REVIEW OF SYSTEMS: All systems are reviewed and found to be negative except above    MEDICATIONS  (STANDING):  aprepitant 40 milliGRAM(s) Oral once  dextrose 5% + sodium chloride 0.9%. 1000 milliLiter(s) (125 mL/Hr) IV Continuous <Continuous>  influenza   Vaccine 0.5 milliLiter(s) IntraMuscular once    MEDICATIONS  (PRN):  acetaminophen     Tablet .. 650 milliGRAM(s) Oral every 6 hours PRN Temp greater or equal to 38C (100.4F), Mild Pain (1 - 3)  aluminum hydroxide/magnesium hydroxide/simethicone Suspension 30 milliLiter(s) Oral every 4 hours PRN Dyspepsia  HYDROmorphone  Injectable 0.5 milliGRAM(s) IV Push every 4 hours PRN Severe Pain (7 - 10)  melatonin 3 milliGRAM(s) Oral at bedtime PRN Insomnia  ondansetron Injectable 4 milliGRAM(s) IV Push every 8 hours PRN Nausea and/or Vomiting      CAPILLARY BLOOD GLUCOSE        I&O's Summary      PHYSICAL EXAM:  Vital Signs Last 24 Hrs  T(C): 37.1 (22 Dec 2022 11:16), Max: 37.1 (22 Dec 2022 11:16)  T(F): 98.7 (22 Dec 2022 11:16), Max: 98.7 (22 Dec 2022 11:16)  HR: 78 (22 Dec 2022 11:16) (60 - 80)  BP: 136/73 (22 Dec 2022 11:16) (116/64 - 136/73)  BP(mean): 90 (22 Dec 2022 00:00) (90 - 90)  RR: 18 (22 Dec 2022 11:16) (16 - 18)  SpO2: 99% (22 Dec 2022 11:16) (98% - 99%)    Parameters below as of 22 Dec 2022 11:16  Patient On (Oxygen Delivery Method): room air        CONSTITUTIONAL: NAD,  EYES: PERRLA; conjunctiva and sclera clear  ENMT: Moist oral mucosa,   RESPIRATORY: Normal respiratory effort; lungs are clear to auscultation bilaterally  CARDIOVASCULAR: Regular rate and rhythm, normal S1 and S2, no murmur   EXTS: No lower extremity edema; Peripheral pulses are 2+ bilaterally  ABDOMEN: Nontender to palpation, normoactive bowel sounds, no rebound/guarding;   MUSCLOSKELETAL:   BACK: Tender lower L SPINE, rom restricted for pain, decrease leg rising against the gravity with pain.  no joints swelling.   PSYCH: affect appropriate  NEUROLOGY: A+O to person, place, and time; CN 2-12 are intact and symmetric; no gross sensory deficits;       LABS:                        11.1   11.82 )-----------( 323      ( 22 Dec 2022 02:20 )             34.3     12-22    136  |  102  |  15.0  ----------------------------<  141<H>  3.8   |  22.0  |  0.67    Ca    9.0      22 Dec 2022 02:20  Phos  2.4     12-22  Mg     1.9     12-22    TPro  7.8  /  Alb  4.3  /  TBili  0.2<L>  /  DBili  x   /  AST  14  /  ALT  11  /  AlkPhos  94  12-21    PT/INR - ( 21 Dec 2022 20:45 )   PT: 13.6 sec;   INR: 1.17 ratio         PTT - ( 21 Dec 2022 20:45 )  PTT:28.7 sec            RADIOLOGY & ADDITIONAL TESTS:  Results Reviewed:   < from: MR Lumbar Spine No Cont (12.21.22 @ 22:49) >  IMPRESSION:  Right foraminal/paracentral disc protrusion at L5-S1 compressing the   traversing  right S1 nerve root and impinging the exiting right L5 nerve root.   < from: Xray Chest 1 View-PORTABLE IMMEDIATE (Xray Chest 1 View-PORTABLE IMMEDIATE .) (12.22.22 @ 00:33) >  COMPARISON: None available.    Shallow inspiration crowds the chest.    Heart magnified by technique.    No definite infiltrate.    IMPRESSION: As above.    < end of copied text >    < end of copied text >

## 2022-12-22 NOTE — BRIEF OPERATIVE NOTE - ESTIMATED BLOOD LOSS
Medical Necessity Clause: This procedure was medically necessary because the lesions that were treated were: 50 Consent: The patient's consent was obtained including but not limited to risks of crusting, scabbing, blistering, scarring, darker or lighter pigmentary change, recurrence, incomplete removal and infection. Total Number Of Lesions Treated: 3 Post-Care Instructions: I reviewed with the patient in detail post-care instructions. Patient is to wear sunprotection, and avoid picking at any of the treated lesions. Pt may apply Vaseline to crusted or scabbing areas. Call if not resolved in 6 weeks. Medical Necessity Information: It is in your best interest to select a reason for this procedure from the list below. All of these items fulfill various CMS LCD requirements except the new and changing color options. Duration Of Freeze Thaw-Cycle (Seconds): 0 Render Post Care In The Note?: yes Detail Level: Zone Add 52 Modifier (Optional): no

## 2022-12-22 NOTE — PHYSICAL THERAPY INITIAL EVALUATION ADULT - GENERAL OBSERVATIONS, REHAB EVAL
Pt received in bed, + IV Loc, + b/l VCBs, family present, breathing on RA in NAD, in 10/10 low back pain, agreeable to PT evaluation

## 2022-12-22 NOTE — PHYSICAL THERAPY INITIAL EVALUATION ADULT - FOLLOWS COMMANDS/ANSWERS QUESTIONS, REHAB EVAL
"      SUBJECTIVE     The patient is feeling \"much better\" compared to 24 hours ago.  The patient's daughter was on speaker phone during my encounter.  The patient presently has no significant chest pain, shortness of breath at rest, orthopnea, paroxysmal nocturnal dyspnea, lightheadedness or presyncope, or palpitations.  She remains on intravenous unfractionated heparin infusion.  She converted to sinus rhythm at approximately 8:30 AM. She underwent ultrasound-guided pericardiocentesis on 7/29/2022.  Limited transthoracic echocardiogram suggested \"there does not appear to be a significant residual pericardial effusion at completion of study.\"    OBJECTIVE     VITAL SIGNS:  Temp:  [36.3 °C (97.3 °F)-36.9 °C (98.5 °F)] 36.3 °C (97.3 °F)  Heart Rate:  [] 70  Resp:  [16-19] 18  BP: (110-156)/(55-80) 156/65  SPO2 95%    Intake/Output Summary (Last 24 hours) at 7/30/2022 0935  Last data filed at 7/29/2022 1508  Gross per 24 hour   Intake --   Output 10 ml   Net -10 ml     PHYSICAL EXAM:  General Appearance: No apparent distress.  Cooperative.  Neck: No JVD  Lungs: Clear to auscultation bilaterally  Heart: Irregular S1 and S2 without new / changing murmur, gallop or rub  Abdomen: Bowel sounds are present.  Extremities: No significant peripheral edema is appreciated.  Skin: Warm.  Neurologic: Alert and oriented.    LABS / IMAGING / EKG / TELEMETRY     LABS:  Results from last 7 days   Lab Units 07/30/22  0418 07/29/22  0426 07/28/22  0453 07/27/22  1510 07/27/22  0954   SODIUM mEQ/L  --  135* 136  --  139   POTASSIUM mEQ/L  --  3.8 3.9  --  3.4*   MAGNESIUM mg/dL 1.8 1.9 2.1   < >  --    CALCIUM mg/dL  --  9.1 8.8*  --  9.2   CHLORIDE mEQ/L  --  99 103  --  106   CO2 mEQ/L  --  27 23  --  26   BUN mg/dL  --  24* 22*  --  32*   CREATININE mg/dL  --  1.0 1.0  --  1.1   AST IU/L  --   --   --   --  27   ALT IU/L  --   --   --   --  19    < > = values in this interval not displayed.     Results from last 7 days   Lab " Units 07/30/22  0418 07/29/22  1841 07/29/22  0426 07/28/22  0453   WBC K/uL 8.29  --  9.64 9.64   HEMOGLOBIN g/dL 11.4*  --  10.7* 10.9*   HEMATOCRIT % 34.6*  --  32.1* 33.5*   PLATELETS K/uL 349  --  300 278   INR   --  1.4  --   --      Lab Results   Component Value Date    TSH 1.89 07/19/2022     No results found for: CHOL, LDLCALC, HDL, TRIG  Lab Results   Component Value Date     (H) 07/27/2022     Results from last 7 days   Lab Units 07/28/22  0453 07/27/22  2257 07/27/22 2008   HIGH SENSITIVE TROPONIN I pg/mL 221.3* 239.0* 203.0*       TELEMETRY:  Sinus rhythm with occasional supraventricular ectopy    MEDICATIONS         acetaminophen  325 mg oral BID (9a, 10p)    [START ON 8/2/2022] amiodarone  200 mg oral Daily    amiodarone  400 mg oral BID    cetirizine  5 mg oral Daily    clopidogreL  75 mg oral Daily    colchicine  0.6 mg oral Daily (6p)    famotidine  40 mg oral Nightly    metoprolol tartrate  25 mg oral BID    pantoprazole  40 mg oral Daily    simvastatin  20 mg oral Nightly    traMADoL  25 mg oral BID (9a, 10p)       ASSESSMENT AND PLAN     Atrial fibrillation-flutter: The patient converted to sinus rhythm at approximately 8:30 AM on 7/30/2022 and is presently maintaining sinus rhythm.  12 lead EKG ordered.  She is prescribed amiodarone 200 mg daily rhythm maintenance.  She is anticoagulated with intravenous unfractionated heparin for the prevention of thromboembolism.    Anticoagulation: At present she is on intravenous unfractionated heparin with plans to transition to DOAC on 8/1/2022.    Pericardial effusion: Post procedure day #1 status post ultrasound-guided pericardiocentesis.  No evidence of tachycardia, hypertension, or jugular venous distention to suggest reaccumulation of pericardial fluid; plan to reassess limited echocardiogram on 8/1/2022, sooner for change in clinical status.  Concerns of pericarditis, currently receiving colchicine 0.6 mg daily.  At present the  patient denies any significant pleuritic chest pain or shortness of breath.    Coronary artery disease: Proximal to mid left circumflex and proximal right coronary artery PCI performed 6/9/2022; complicated by RCA stent thrombosis status post intervention.  The patient is receiving clopidogrel 75 mg daily in conjunction with systemic anticoagulation.  The patient denies anginal symptoms.  Continue medical management with clopidogrel, simvastatin, and metoprolol.    Tachybradycardia syndrome: Asymptomatic in the context of left bundle dual-chamber chronic pacemaker implant on 7/21/2022.  Continue device surveillance and management deferred to EP.    Hypertension: The blood pressure has been intermittently elevated.  Continue medical management and routine surveillance per unit protocol.  If persistent may require adjustment of antihypertensive medications.    Hyperlipidemia: The patient is managed with simvastatin.    Communicated with Dr. Santos.  Communicated with the patient's daughter.  Communicated with the patient's nurse.    Baljinder Lowery MD  7/30/2022    Primary Care Doctor: Jonathan Meza DO     100% of the time

## 2022-12-23 ENCOUNTER — TRANSCRIPTION ENCOUNTER (OUTPATIENT)
Age: 37
End: 2022-12-23

## 2022-12-23 VITALS
OXYGEN SATURATION: 99 % | HEART RATE: 82 BPM | RESPIRATION RATE: 18 BRPM | DIASTOLIC BLOOD PRESSURE: 67 MMHG | TEMPERATURE: 99 F | SYSTOLIC BLOOD PRESSURE: 114 MMHG

## 2022-12-23 LAB
ANION GAP SERPL CALC-SCNC: 10 MMOL/L — SIGNIFICANT CHANGE UP (ref 5–17)
BUN SERPL-MCNC: 11.2 MG/DL — SIGNIFICANT CHANGE UP (ref 8–20)
CALCIUM SERPL-MCNC: 8.5 MG/DL — SIGNIFICANT CHANGE UP (ref 8.4–10.5)
CHLORIDE SERPL-SCNC: 106 MMOL/L — SIGNIFICANT CHANGE UP (ref 96–108)
CO2 SERPL-SCNC: 23 MMOL/L — SIGNIFICANT CHANGE UP (ref 22–29)
CREAT SERPL-MCNC: 0.68 MG/DL — SIGNIFICANT CHANGE UP (ref 0.5–1.3)
EGFR: 116 ML/MIN/1.73M2 — SIGNIFICANT CHANGE UP
GLUCOSE SERPL-MCNC: 108 MG/DL — HIGH (ref 70–99)
HCT VFR BLD CALC: 30.6 % — LOW (ref 34.5–45)
HGB BLD-MCNC: 9.9 G/DL — LOW (ref 11.5–15.5)
MCHC RBC-ENTMCNC: 30.4 PG — SIGNIFICANT CHANGE UP (ref 27–34)
MCHC RBC-ENTMCNC: 32.4 GM/DL — SIGNIFICANT CHANGE UP (ref 32–36)
MCV RBC AUTO: 93.9 FL — SIGNIFICANT CHANGE UP (ref 80–100)
PLATELET # BLD AUTO: 282 K/UL — SIGNIFICANT CHANGE UP (ref 150–400)
POTASSIUM SERPL-MCNC: 3.7 MMOL/L — SIGNIFICANT CHANGE UP (ref 3.5–5.3)
POTASSIUM SERPL-SCNC: 3.7 MMOL/L — SIGNIFICANT CHANGE UP (ref 3.5–5.3)
RBC # BLD: 3.26 M/UL — LOW (ref 3.8–5.2)
RBC # FLD: 12.6 % — SIGNIFICANT CHANGE UP (ref 10.3–14.5)
SODIUM SERPL-SCNC: 139 MMOL/L — SIGNIFICANT CHANGE UP (ref 135–145)
WBC # BLD: 12.9 K/UL — HIGH (ref 3.8–10.5)
WBC # FLD AUTO: 12.9 K/UL — HIGH (ref 3.8–10.5)

## 2022-12-23 PROCEDURE — 86900 BLOOD TYPING SEROLOGIC ABO: CPT

## 2022-12-23 PROCEDURE — 93005 ELECTROCARDIOGRAM TRACING: CPT

## 2022-12-23 PROCEDURE — 96376 TX/PRO/DX INJ SAME DRUG ADON: CPT

## 2022-12-23 PROCEDURE — C1889: CPT

## 2022-12-23 PROCEDURE — 94640 AIRWAY INHALATION TREATMENT: CPT

## 2022-12-23 PROCEDURE — C9399: CPT

## 2022-12-23 PROCEDURE — 87637 SARSCOV2&INF A&B&RSV AMP PRB: CPT

## 2022-12-23 PROCEDURE — 86901 BLOOD TYPING SEROLOGIC RH(D): CPT

## 2022-12-23 PROCEDURE — 96374 THER/PROPH/DIAG INJ IV PUSH: CPT

## 2022-12-23 PROCEDURE — 84100 ASSAY OF PHOSPHORUS: CPT

## 2022-12-23 PROCEDURE — 86880 COOMBS TEST DIRECT: CPT

## 2022-12-23 PROCEDURE — 36415 COLL VENOUS BLD VENIPUNCTURE: CPT

## 2022-12-23 PROCEDURE — 86850 RBC ANTIBODY SCREEN: CPT

## 2022-12-23 PROCEDURE — 81001 URINALYSIS AUTO W/SCOPE: CPT

## 2022-12-23 PROCEDURE — 72131 CT LUMBAR SPINE W/O DYE: CPT | Mod: MA

## 2022-12-23 PROCEDURE — 84702 CHORIONIC GONADOTROPIN TEST: CPT

## 2022-12-23 PROCEDURE — 99285 EMERGENCY DEPT VISIT HI MDM: CPT

## 2022-12-23 PROCEDURE — 87640 STAPH A DNA AMP PROBE: CPT

## 2022-12-23 PROCEDURE — 96375 TX/PRO/DX INJ NEW DRUG ADDON: CPT

## 2022-12-23 PROCEDURE — 85610 PROTHROMBIN TIME: CPT

## 2022-12-23 PROCEDURE — 85730 THROMBOPLASTIN TIME PARTIAL: CPT

## 2022-12-23 PROCEDURE — 85027 COMPLETE CBC AUTOMATED: CPT

## 2022-12-23 PROCEDURE — 99239 HOSP IP/OBS DSCHRG MGMT >30: CPT

## 2022-12-23 PROCEDURE — 72148 MRI LUMBAR SPINE W/O DYE: CPT | Mod: MA

## 2022-12-23 PROCEDURE — 76000 FLUOROSCOPY <1 HR PHYS/QHP: CPT

## 2022-12-23 PROCEDURE — 83735 ASSAY OF MAGNESIUM: CPT

## 2022-12-23 PROCEDURE — 86905 BLOOD TYPING RBC ANTIGENS: CPT

## 2022-12-23 PROCEDURE — 71045 X-RAY EXAM CHEST 1 VIEW: CPT

## 2022-12-23 PROCEDURE — 86870 RBC ANTIBODY IDENTIFICATION: CPT

## 2022-12-23 PROCEDURE — 80048 BASIC METABOLIC PNL TOTAL CA: CPT

## 2022-12-23 PROCEDURE — 87641 MR-STAPH DNA AMP PROBE: CPT

## 2022-12-23 PROCEDURE — 80053 COMPREHEN METABOLIC PANEL: CPT

## 2022-12-23 PROCEDURE — 88304 TISSUE EXAM BY PATHOLOGIST: CPT

## 2022-12-23 RX ORDER — SENNA PLUS 8.6 MG/1
2 TABLET ORAL
Qty: 14 | Refills: 0
Start: 2022-12-23 | End: 2022-12-29

## 2022-12-23 RX ORDER — OXYCODONE HYDROCHLORIDE 5 MG/1
1 TABLET ORAL
Qty: 16 | Refills: 0
Start: 2022-12-23 | End: 2022-12-26

## 2022-12-23 RX ORDER — POLYETHYLENE GLYCOL 3350 17 G/17G
17 POWDER, FOR SOLUTION ORAL
Qty: 119 | Refills: 0
Start: 2022-12-23 | End: 2022-12-29

## 2022-12-23 RX ORDER — CYCLOBENZAPRINE HYDROCHLORIDE 10 MG/1
1 TABLET, FILM COATED ORAL
Qty: 30 | Refills: 0
Start: 2022-12-23 | End: 2023-01-01

## 2022-12-23 RX ADMIN — Medication 2000 MILLIGRAM(S): at 05:30

## 2022-12-23 RX ADMIN — CYCLOBENZAPRINE HYDROCHLORIDE 10 MILLIGRAM(S): 10 TABLET, FILM COATED ORAL at 14:58

## 2022-12-23 RX ADMIN — OXYCODONE HYDROCHLORIDE 10 MILLIGRAM(S): 5 TABLET ORAL at 05:33

## 2022-12-23 RX ADMIN — OXYCODONE HYDROCHLORIDE 10 MILLIGRAM(S): 5 TABLET ORAL at 06:33

## 2022-12-23 RX ADMIN — CYCLOBENZAPRINE HYDROCHLORIDE 10 MILLIGRAM(S): 10 TABLET, FILM COATED ORAL at 05:31

## 2022-12-23 NOTE — PROGRESS NOTE ADULT - ASSESSMENT
37F w/ PMHX of obesity, marijuana use presents to ED c/o LBP radiating down LLE. Found to have L5-S1 HNP  s/p L5-S1 discectomy POD#1 and doing well      Plan   - Q4 neuro checks   - Pain control PRN; Tylenol, Oxy 5/10    - Normotensive   - Monitor nausea, vomiting; Zofran PRN   - b/l SCDs; hold AC/AP/chemical DVT prophylaxis at this time   - Ins/outs   - Record stool count; Miralax, Senna   - diet well tolerated   - Increase activity as tolerated, PT/OT cleared for d/c  - No brace required   - Wound: Closed w/ Monocryl, steri strips, gauze, tegaderm. 12/25. Steri strips to fall off. Permitted to showed on POD#5, 12/27.    - pt stable from NSx standpoint for d/c   - D/w Dr. Castellanos

## 2022-12-23 NOTE — DISCHARGE NOTE PROVIDER - NSDCMRMEDTOKEN_GEN_ALL_CORE_FT
cyclobenzaprine 10 mg oral tablet: 1 tab(s) orally every 8 hours  oxyCODONE 5 mg oral tablet: 1 tab(s) orally every 6 hours, As needed, Moderate Pain (4 - 6) MDD:4  polyethylene glycol 3350 oral powder for reconstitution: 17 gram(s) orally every 24 hours  senna leaf extract oral tablet: 2 tab(s) orally once a day (at bedtime), As Needed -for chest pain - for constipation

## 2022-12-23 NOTE — PROGRESS NOTE ADULT - SUBJECTIVE AND OBJECTIVE BOX
NEUROSURGERY PROGRESS NOTE:    Procedure: L5-S1 discectomy   Diagnosis/Indication: L5-S1 extrusion   Surgeon: Dr. Castellanos      INTERVAL HPI/ACUTE EVENTS:  37F w/ PMHX of obesity, marijuana use presents to ED c/o LBP radiating down LLE. Found to have L5-S1  s/p L5-S1 discectomy POD#1  pt seen and states is at baseline, denied any new or worsening sensorimotor changes, states she has residual/ improved R    pt c/o + -headache  /10 on the pain scale   + - Nausea /+ - Vomiting  admits denies weakness  admits denies numbness/ tingling  admist denies visual changes  admist denies C/T/LS  Spine pain  + - void  + - BM  + OOB  + bed rest   + - diet  + cerrato cath to gravity   + - venodynes b/l when in bed   + - EUNICE HMV EVD drain  + - a-line/ TLC  + - tube feeds    MEDICATIONS  (STANDING):  cyclobenzaprine 10 milliGRAM(s) Oral every 8 hours  influenza   Vaccine 0.5 milliLiter(s) IntraMuscular once  polyethylene glycol 3350 17 Gram(s) Oral every 24 hours  senna 2 Tablet(s) Oral at bedtime    MEDICATIONS  (PRN):  oxyCODONE    IR 5 milliGRAM(s) Oral every 6 hours PRN Moderate Pain (4 - 6)  oxyCODONE    IR 10 milliGRAM(s) Oral every 6 hours PRN Severe Pain (7 - 10)    Allergies    No Known Allergies    Intolerances      Vital Signs Last 24 Hrs  T(C): 36.6 (23 Dec 2022 09:06), Max: 37.3 (22 Dec 2022 16:37)  T(F): 97.9 (23 Dec 2022 09:06), Max: 99.1 (22 Dec 2022 16:37)  HR: 63 (23 Dec 2022 09:06) (58 - 77)  BP: 101/62 (23 Dec 2022 09:06) (101/62 - 146/79)  BP(mean): 80 (22 Dec 2022 15:30) (76 - 89)  RR: 18 (23 Dec 2022 09:06) (14 - 20)  SpO2: 95% (23 Dec 2022 09:06) (95% - 100%)    Parameters below as of 23 Dec 2022 09:06  Patient On (Oxygen Delivery Method): room air            PHYSICAL EXAM:    GENERAL: NAD, well-groomed, well-developed, AAOx3 and very cooperative  HEAD:  Atraumatic, Normocephalic, no palpable step-off appreciated on palpation  EYES: b/l EOMI, PERRL, conjunctiva and sclera clear,   NECK: Supple, nontender to palpation  + FROM w no muscular soreness reported, neg B/B/K signs.  TS/LS: nontender to palpation midline or paraspinal muscles b/l, no pinpoint tenderness appreciated or paraspinal mm tenderness elicited on palpation b/l   NERVOUS SYSTEM:  Alert & Oriented X3, speech is clear and fluent, no dysarthria appreciated. Good concentration & very cooperative; Motor Strength 5/5 B/L upper and lower extremities, sensory is at baseline and symmetric b/l; No pronators or ankle clonus appreciated b/l, cerebellar signs grossly intact b/l. CN II-XII grossly intact b/l and symmetric; Spurling's test negative b/l; no gonzalez's b/l appreciated. no ankle clonus appreciated b/l.   EXTREMITIES:  2+ Peripheral Pulses, No clubbing, cyanosis, or edema, Hawkin's test negative b/l.   CHEST/LUNG: Clear to auscultation bilaterally; No rales, rhonchi, wheezing, or rubs  HEART: Regular rate and rhythm; +S1 & +S2  ABDOMEN: Soft, Nontender, Nondistended; Bowel sounds present  LYMPH: No lymphadenopathy noted  SKIN: No rashes or lesions        LABS:                        9.9    12.90 )-----------( 282      ( 23 Dec 2022 06:10 )             30.6     12    139  |  106  |  11.2  ----------------------------<  108<H>  3.7   |  23.0  |  0.68    Ca    8.5      23 Dec 2022 06:10  Phos  2.4     12-  Mg     1.9     12-    TPro  7.8  /  Alb  4.3  /  TBili  0.2<L>  /  DBili  x   /  AST  14  /  ALT  11  /  AlkPhos  94  12-21    PT/INR - ( 21 Dec 2022 20:45 )   PT: 13.6 sec;   INR: 1.17 ratio         PTT - ( 21 Dec 2022 20:45 )  PTT:28.7 sec  Urinalysis Basic - ( 22 Dec 2022 22:44 )    Color: Yellow / Appearance: Clear / S.010 / pH: x  Gluc: x / Ketone: Negative  / Bili: Negative / Urobili: Negative mg/dL   Blood: x / Protein: Negative / Nitrite: Negative   Leuk Esterase: Negative / RBC: 3-5 /HPF / WBC 0-2 /HPF   Sq Epi: x / Non Sq Epi: Occasional / Bacteria: Occasional          RADIOLOGY & ADDITIONAL TESTS:  no new NSx images available for review       < from: MR Lumbar Spine No Cont (22 @ 22:49) >  IMPRESSION: Right foraminal/paracentral disc protrusion at L5-S1 compressing the traversing right S1 nerve root and impinging the exiting right L5 nerve root.   Recommend surgical consultation.  Final report: Agree with preliminary report.  < end of copied text >      I spent a total time of 25 mins with the patient at bedside of which more than 50% of time was spent on counseling/coordination of care NEUROSURGERY PROGRESS NOTE:    Procedure: L5-S1 discectomy   Diagnosis/Indication: L5-S1 extrusion   Surgeon: Dr. Castellanos      INTERVAL HPI/ACUTE EVENTS:  37F w/ PMHX of obesity, marijuana use presents to ED c/o LBP radiating down LLE. Found to have L5-S1  s/p L5-S1 discectomy POD#1  pt seen and states is at baseline, denied any new or worsening sensorimotor changes, states she has residual/ improved R lateral thigh numbness. wishes to go home today  -headache   - Nausea    - Vomiting  denies weakness  denies visual changes  + incisional pain   + void  + OOB w PT: independent   + bed rest   + diet  + venodynes b/l when in bed         MEDICATIONS  (STANDING):  cyclobenzaprine 10 milliGRAM(s) Oral every 8 hours  influenza   Vaccine 0.5 milliLiter(s) IntraMuscular once  polyethylene glycol 3350 17 Gram(s) Oral every 24 hours  senna 2 Tablet(s) Oral at bedtime    MEDICATIONS  (PRN):  oxyCODONE    IR 5 milliGRAM(s) Oral every 6 hours PRN Moderate Pain (4 - 6)  oxyCODONE    IR 10 milliGRAM(s) Oral every 6 hours PRN Severe Pain (7 - 10)    Allergies    No Known Allergies    Intolerances      Vital Signs Last 24 Hrs  T(C): 36.6 (23 Dec 2022 09:06), Max: 37.3 (22 Dec 2022 16:37)  T(F): 97.9 (23 Dec 2022 09:06), Max: 99.1 (22 Dec 2022 16:37)  HR: 63 (23 Dec 2022 09:06) (58 - 77)  BP: 101/62 (23 Dec 2022 09:06) (101/62 - 146/79)  BP(mean): 80 (22 Dec 2022 15:30) (76 - 89)  RR: 18 (23 Dec 2022 09:06) (14 - 20)  SpO2: 95% (23 Dec 2022 09:06) (95% - 100%)    Parameters below as of 23 Dec 2022 09:06  Patient On (Oxygen Delivery Method): room air            PHYSICAL EXAM:  GENERAL: NAD, well-groomed, well-developed/ obese female  HEAD: Normocephalic, atraumatic   DRAINS: N/a  BACK: Dressing clean dry intact, minimal SS drainage noted to the upper part of the incision/dressing. Steri-strips intact w/o active oozing/bleeding/ sterile island dressing applied   BC COMA SCORE: E-4 V-5 M-6 = 15  MENTAL STATUS: AAO x3; Awake; Opens eyes spontaneously; Appropriately conversant without aphasia; following simple commands  CRANIAL NERVES: Visual acuity normal for age, PERRL. EOMI without nystagmus. Facial sensation intact V1-3 distribution b/l. Face symmetric w/ normal eye closure and smile, tongue midline. Hearing grossly intact. Speech clear. keloids noted to lower chin/neck  MOTOR: strength 5/5 b/l upper and lower extremities; no drift  Uppers     Delt (C5/6)     Bicep (C5/6)     Wrist Extend (C6)     Tricep (C7)     HG (C8/T1)  R                     5/5                 5/5                         5/5                           5/5                   5/5  L                      5/5                 5/5                         5/5                           5/5                   5/5  Lowers      HF(L1/L2)     KE (L3)     DF (L4)     EHL (L5)     PF (S1)      R                     5/5              5/5           5/5           5/5            5/5  L                     5/5               5/5          5/5            5/5            5/5  SENSATION: grossly intact to light touch all extremities  CHEST/LUNG: Nonlabored breaths          LABS:                        9.9    12.90 )-----------( 282      ( 23 Dec 2022 06:10 )             30.6         139  |  106  |  11.2  ----------------------------<  108<H>  3.7   |  23.0  |  0.68    Ca    8.5      23 Dec 2022 06:10  Phos  2.4     12-  Mg     1.9         TPro  7.8  /  Alb  4.3  /  TBili  0.2<L>  /  DBili  x   /  AST  14  /  ALT  11  /  AlkPhos  94  12-    PT/INR - ( 21 Dec 2022 20:45 )   PT: 13.6 sec;   INR: 1.17 ratio         PTT - ( 21 Dec 2022 20:45 )  PTT:28.7 sec  Urinalysis Basic - ( 22 Dec 2022 22:44 )    Color: Yellow / Appearance: Clear / S.010 / pH: x  Gluc: x / Ketone: Negative  / Bili: Negative / Urobili: Negative mg/dL   Blood: x / Protein: Negative / Nitrite: Negative   Leuk Esterase: Negative / RBC: 3-5 /HPF / WBC 0-2 /HPF   Sq Epi: x / Non Sq Epi: Occasional / Bacteria: Occasional          RADIOLOGY & ADDITIONAL TESTS:  no new NSx images available for review       < from: MR Lumbar Spine No Cont (22 @ 22:49) >  IMPRESSION: Right foraminal/paracentral disc protrusion at L5-S1 compressing the traversing right S1 nerve root and impinging the exiting right L5 nerve root.   Recommend surgical consultation.  Final report: Agree with preliminary report.  < end of copied text >      I spent a total time of 25 mins with the patient at bedside of which more than 50% of time was spent on counseling/coordination of care

## 2022-12-23 NOTE — DISCHARGE NOTE PROVIDER - CARE PROVIDER_API CALL
Adrian Castellanos; PhD)  Neurosurgery  [address] [place] [address]  Phone: [phone]  Fax: [phone]  Follow Up Time: 1 week

## 2022-12-23 NOTE — DISCHARGE NOTE PROVIDER - HOSPITAL COURSE
37F w/ PMHX of obesity, marijuana use presents to ED c/o LBP radiating down LLE. Found to have Right foraminal/paracentral disc protrusion at L5-S1 compressing the traversing.right S1 nerve root and impinging the exiting right L5 nerve root. s/p L5-S1 discectomy 12/22/22. Pt is feeling better,improved RLE pain an motor function. Seen by PT . Pt is cleared by neurosurgery to discharge home. f/u Neurosurgery out pt. home with PT.

## 2022-12-23 NOTE — DISCHARGE NOTE PROVIDER - NSDCCPCAREPLAN_GEN_ALL_CORE_FT
PRINCIPAL DISCHARGE DIAGNOSIS  Diagnosis: Disorder of intervertebral disc of lumbar spine  Assessment and Plan of Treatment:

## 2022-12-23 NOTE — DISCHARGE NOTE PROVIDER - ATTENDING DISCHARGE PHYSICAL EXAMINATION:
T(C): 36.6 (12-23-22 @ 09:06), Max: 37.3 (12-22-22 @ 16:37)  HR: 63 (12-23-22 @ 09:06) (58 - 77)  BP: 101/62 (12-23-22 @ 09:06) (101/62 - 146/79)  RR: 18 (12-23-22 @ 09:06) (14 - 20)  SpO2: 95% (12-23-22 @ 09:06) (95% - 100%)    GEN - NAD  HEENT - NCAT, EOMI, GRAY,   RESP - CTA BL, no wheeze/rhonchi/crackles.  CARDIO - NS1S2, RRR. No murmurs  ABD - Soft/Non tender/Non distended. Normal BS x4 quadrants.   Ext - No SARINA.  MSK - full ROM of BL upper and lower extremities without pain or restriction. BL 5/5 strength on upper and lower extremities. back-mild tendernes lower back,rom improve compare to yesterday  Neuro - cn 2-12 grossly intact.  gait not observed.     Psych- AAOx3. no suicidal/homicidal ideation. appropriate behaviour. attentive. normal affect.

## 2022-12-23 NOTE — DISCHARGE NOTE NURSING/CASE MANAGEMENT/SOCIAL WORK - PATIENT PORTAL LINK FT
You can access the FollowMyHealth Patient Portal offered by [facility] by registering at the following website: [email or web address]. By joining [facility]’s FollowMyHealth portal, you will also be able to view your health information using other applications (apps) compatible with our system.

## 2022-12-23 NOTE — DISCHARGE NOTE NURSING/CASE MANAGEMENT/SOCIAL WORK - NSDCPEFALRISK_GEN_ALL_CORE
For information on Fall & Injury Prevention, visit: [email or web address] OR  [email or web address] OR  https://www.cdc.gov/steadi/patient.html

## 2022-12-27 PROBLEM — Z78.9 OTHER SPECIFIED HEALTH STATUS: Chronic | Status: ACTIVE | Noted: 2022-12-22

## 2022-12-28 LAB — SURGICAL PATHOLOGY STUDY: SIGNIFICANT CHANGE UP

## 2023-01-10 ENCOUNTER — APPOINTMENT (OUTPATIENT)
Dept: NEUROSURGERY | Facility: CLINIC | Age: 38
End: 2023-01-10
Payer: COMMERCIAL

## 2023-01-10 VITALS
HEART RATE: 91 BPM | OXYGEN SATURATION: 98 % | DIASTOLIC BLOOD PRESSURE: 88 MMHG | TEMPERATURE: 97.8 F | SYSTOLIC BLOOD PRESSURE: 132 MMHG

## 2023-01-10 DIAGNOSIS — M54.16 RADICULOPATHY, LUMBAR REGION: ICD-10-CM

## 2023-01-10 PROCEDURE — 99024 POSTOP FOLLOW-UP VISIT: CPT

## 2023-01-10 NOTE — PHYSICAL EXAM
[FreeTextEntry1] : awake, alert\par interactive, appropriate\par RUE 5/5 D/B/T/WE/WF//IO\par LUE 5/5 D/B/T/WE/WF//IO\par RLE 5/5 HF/KE/KF/DF/PF/EHL\par LLE 5/5 HF/KE/KF/DF/PF/EHL\par SILT\par negative Flaherty's bilaterally\par negative clonus bilaterally\par narrow-based gait wnl\par reflexes 2+\par Incision intact

## 2023-01-10 NOTE — HISTORY OF PRESENT ILLNESS
[FreeTextEntry1] : Ms. Shanita Patel presents for post operative office visit. She is s/p L5-S1 discectomy on 12/22/2022. PATH- L4-L5 HD, Degenerated fibrocartilage. She presented to ED with complaints of low back pain radiating down LLE and right plantarflexion weakness.  MRI LS Right foraminal/paracentral disc protrusion at L5-S1 compressing the traversing.right S1 nerve root and impinging the exiting right L5 nerve root. Surgery went well without complications.  Pt is feeling better with improved RLE \par pain and motor function. She was discharged to home on 12/23/22. Incision healing well. NO signs of infection. Patient anxious to return to work. Neuro exam intact. \par \par Plan: Can return to work on Thursday, January 19th, 2023\par Letter given to pt

## 2024-07-02 NOTE — PHYSICAL THERAPY INITIAL EVALUATION ADULT - BALANCE DISTURBANCE, IDENTIFIED IMPAIRMENT CONTRIBUTE, REHAB EVAL
Transitional Care Management Telephone Call Attempt    Discharge Date: 6/30/24  Discharge Location: [facility]: [facility]    Call Attempt Date: 7/2/2024  Call Attempt: First   pain/decreased strength

## 2025-04-21 ENCOUNTER — EMERGENCY (EMERGENCY)
Facility: HOSPITAL | Age: 40
LOS: 1 days | End: 2025-04-21
Attending: EMERGENCY MEDICINE
Payer: MEDICAID

## 2025-04-21 VITALS
SYSTOLIC BLOOD PRESSURE: 138 MMHG | WEIGHT: 230.16 LBS | RESPIRATION RATE: 18 BRPM | TEMPERATURE: 98 F | HEART RATE: 97 BPM | DIASTOLIC BLOOD PRESSURE: 85 MMHG | OXYGEN SATURATION: 97 %

## 2025-04-21 DIAGNOSIS — S01.81XA LACERATION WITHOUT FOREIGN BODY OF OTHER PART OF HEAD, INITIAL ENCOUNTER: Chronic | ICD-10-CM

## 2025-04-21 PROCEDURE — 99284 EMERGENCY DEPT VISIT MOD MDM: CPT

## 2025-04-21 PROCEDURE — 99283 EMERGENCY DEPT VISIT LOW MDM: CPT

## 2025-04-21 RX ORDER — DIPHENHYDRAMINE HCL 12.5MG/5ML
50 ELIXIR ORAL ONCE
Refills: 0 | Status: COMPLETED | OUTPATIENT
Start: 2025-04-21 | End: 2025-04-21

## 2025-04-21 RX ORDER — PREDNISONE 20 MG/1
2 TABLET ORAL
Qty: 8 | Refills: 0
Start: 2025-04-21 | End: 2025-04-24

## 2025-04-21 RX ORDER — PREDNISONE 20 MG/1
50 TABLET ORAL ONCE
Refills: 0 | Status: COMPLETED | OUTPATIENT
Start: 2025-04-21 | End: 2025-04-21

## 2025-04-21 RX ADMIN — PREDNISONE 50 MILLIGRAM(S): 20 TABLET ORAL at 21:41

## 2025-04-21 RX ADMIN — Medication 50 MILLIGRAM(S): at 21:41

## 2025-04-21 NOTE — ED PROVIDER NOTE - PATIENT PORTAL LINK FT
You can access the FollowMyHealth Patient Portal offered by [facility] by registering at the following website: [email or web address]. By joining [facility]’s FollowMyHealth portal, you will also be able to view your health information using other applications (apps) compatible with our system.

## 2025-04-21 NOTE — ED PROVIDER NOTE - PHYSICAL EXAMINATION
Gen: No acute distress, non toxic  HEENT: Mucous membranes moist, pink conjunctivae, EOMI  CV: RRR, nl s1/s2.  Resp: CTAB, normal rate and effort  GI: Abdomen soft, NT, ND. No rebound, no guarding  : No CVAT  Neuro: A&O x 3, moving all 4 extremities  MSK: No spine or joint tenderness to palpation  Skin: Diffuse urticaria

## 2025-04-21 NOTE — ED ADULT NURSE NOTE - OBJECTIVE STATEMENT
Pt A&Ox4, resp. Pt presents with diffuse hives on bilateral arms, back and chest/abdomen. Pt is not sure what caused the reaction. Pt denies new pets, gardening, new detergent. NAD noted at this time. Medicated per orders.

## 2025-04-21 NOTE — ED PROVIDER NOTE - OBJECTIVE STATEMENT
38 yo F no PMH presents to ER c/o hives. Reports started last week to left arm, took benadryl and sx seemed to be improving, however now returning and hives are diffuse to both arms, chest, back, abdomen, itchy in nature. last dose benadryl yesterday. denies SOB, n/v. allergies to seafood, latex, fresh cut grass and sary, but denies any exposures to these recently.

## 2025-04-21 NOTE — ED PROVIDER NOTE - CLINICAL SUMMARY MEDICAL DECISION MAKING FREE TEXT BOX
40 yo F no PMH presents to ER c/o hives. Reports started last week to left arm, took benadryl and sx seemed to be improving, however now returning and hives are diffuse to both arms, chest, back, abdomen, itchy in nature. last dose benadryl yesterday. VSS. lungs cta. diffuse urticaria. will start on steroids, continue benadryl, f/u allergist

## 2025-04-21 NOTE — ED PROVIDER NOTE - CARE PROVIDER_API CALL
Dimitri Peng J  Allergy and Immunology  [address] [place] [address]  Phone: [phone]  Fax: [phone]  Follow Up Time:

## 2025-04-21 NOTE — ED ADULT TRIAGE NOTE - CHIEF COMPLAINT QUOTE
pt walk in with diffuse hives and itching to under breasts, upper arms, back, back of thighs. Pt denies new detergents or foods, denies other complaints

## 2025-04-21 NOTE — ED PROVIDER NOTE - NSFOLLOWUPINSTRUCTIONS_ED_ALL_ED_FT
Please take prednisone as directed  Take benadryl 25mg every 6 hours as needed  Follow up with allergist  Return to ER for new or worsening symptoms    Allergic Reaction    An allergic reaction is an abnormal reaction to a substance (allergen) by the body's defense system. Common allergens include medicines, food, insect bites or stings, and blood products. The body releases certain proteins into the blood that can cause a variety of symptoms such as an itchy rash, wheezing, swelling of the face/lips/tongue/throat, abdominal pain, nausea or vomiting. An allergic reaction is usually treated with medication. If your health care provider prescribed you an epinephrine injection device, make sure to keep it with you at all times.    SEEK IMMEDIATE MEDICAL CARE IF YOU HAVE ANY OF THE FOLLOWING SYMPTOMS: allergic reaction severe enough that required you to use epinephrine, tightness in your chest, swelling around your lips/tongue/throat, abdominal pain, vomiting or diarrhea, or lightheadedness/dizziness. These symptoms may represent a serious problem that is an emergency. Do not wait to see if the symptoms will go away. Use your auto-injector pen or anaphylaxis kit as you have been instructed. Call 911 and do not drive yourself to the hospital.

## 2025-04-21 NOTE — ED PROVIDER NOTE - ATTENDING APP SHARED VISIT CONTRIBUTION OF CARE
diffuse uritcaria; mild; no facial or airway involved; no wheezing; well appearing; clear oropharynx; normal lungs; abd soft nt nd; urticaria visible on arms legs and neck; agree with acp plan of care    This was a shared visit with BERTIN. I reviewed and verified the documentation and independently performed the documented history/exam/mdm.

## 2025-06-19 NOTE — PATIENT PROFILE ADULT - NSPROLASTMENSTRUAL_GEN_A_NUR
Email from [facility] [phone]- patient in sick.    I spoke to patient today and she has been rescheduled for 7/23/25. 6:30am arrival time. Patient has her bowel prep and instructions. Sending Lookback message to patient with new date.    unknown

## (undated) DEVICE — DRAPE SPLIT SHEET 77" X 108"

## (undated) DEVICE — SUT VICRYL 0 18" CT-1 UNDYED (POP-OFF)

## (undated) DEVICE — VENODYNE/SCD SLEEVE CALF MEDIUM

## (undated) DEVICE — GLV 8 PROTEXIS (WHITE)

## (undated) DEVICE — DRAPE TOWEL BLUE 17" X 24"

## (undated) DEVICE — MIDAS REX LEGEND MATCH HEAD FLUTED SM BORE 3.0MM X 10CM

## (undated) DEVICE — DRAPE XL SHEET 77X98"

## (undated) DEVICE — FOLEY TRAY 16FR LF URINE METER SURESTEP

## (undated) DEVICE — PACK NEURO

## (undated) DEVICE — DRAPE INSTRUMENT POUCH 6.75" X 11"

## (undated) DEVICE — DRAPE C ARM UNIVERSAL

## (undated) DEVICE — SPONGE PEANUT AUTO COUNT

## (undated) DEVICE — ELCTR BOVIE TIP NEEDLE INSULATED 6.5" EDGE

## (undated) DEVICE — POSITIONER OA ARM ELEVATOR DISP

## (undated) DEVICE — TUBING JET BIPOLAR

## (undated) DEVICE — DRAPE MICROSCOPE ZEISS

## (undated) DEVICE — DRSG TELFA 3 X 4

## (undated) DEVICE — NDL SPINAL 22G X 3.5" (BLACK)

## (undated) DEVICE — SUT VICRYL 2-0 18" CP-2 UNDYED (POP-OFF)

## (undated) DEVICE — DRAPE TOWEL 1000 SMALL 17" X 11"

## (undated) DEVICE — DRAIN JACKSON PRATT 10FR ROUND END W TROCAR

## (undated) DEVICE — SUT NYLON 2-0 18" FS

## (undated) DEVICE — WARMING BLANKET UPPER ADULT

## (undated) DEVICE — POSITIONER FOAM EGG CRATE ULNAR 2PCS (PINK)

## (undated) DEVICE — CATH IV INTROCAN SAFETY 14G X 1.25" (ORANGE) FEP